# Patient Record
Sex: FEMALE | Race: WHITE | NOT HISPANIC OR LATINO | Employment: FULL TIME | URBAN - NONMETROPOLITAN AREA
[De-identification: names, ages, dates, MRNs, and addresses within clinical notes are randomized per-mention and may not be internally consistent; named-entity substitution may affect disease eponyms.]

---

## 2017-05-12 VITALS
WEIGHT: 212 LBS | HEIGHT: 65 IN | DIASTOLIC BLOOD PRESSURE: 70 MMHG | SYSTOLIC BLOOD PRESSURE: 112 MMHG | BODY MASS INDEX: 35.32 KG/M2

## 2017-05-12 RX ORDER — VALACYCLOVIR HYDROCHLORIDE 500 MG/1
500 TABLET, FILM COATED ORAL 2 TIMES DAILY
COMMUNITY
End: 2020-09-21 | Stop reason: SDUPTHER

## 2017-05-12 RX ORDER — BUPROPION HYDROCHLORIDE 150 MG/1
150 TABLET, EXTENDED RELEASE ORAL 2 TIMES DAILY
COMMUNITY
End: 2017-05-19

## 2017-05-12 RX ORDER — LEVOTHYROXINE SODIUM 0.03 MG/1
25 TABLET ORAL DAILY
COMMUNITY
End: 2017-05-19

## 2017-05-19 ENCOUNTER — OFFICE VISIT (OUTPATIENT)
Dept: OBSTETRICS AND GYNECOLOGY | Facility: CLINIC | Age: 52
End: 2017-05-19

## 2017-05-19 VITALS
HEIGHT: 65 IN | DIASTOLIC BLOOD PRESSURE: 70 MMHG | BODY MASS INDEX: 35.82 KG/M2 | WEIGHT: 215 LBS | SYSTOLIC BLOOD PRESSURE: 120 MMHG

## 2017-05-19 DIAGNOSIS — Z01.419 ENCOUNTER FOR GYNECOLOGICAL EXAMINATION WITHOUT ABNORMAL FINDING: Primary | ICD-10-CM

## 2017-05-19 PROCEDURE — 99396 PREV VISIT EST AGE 40-64: CPT | Performed by: OBSTETRICS & GYNECOLOGY

## 2017-05-19 RX ORDER — VALACYCLOVIR HYDROCHLORIDE 500 MG/1
TABLET, FILM COATED ORAL
COMMUNITY
Start: 2016-11-02 | End: 2017-05-19

## 2017-05-19 RX ORDER — SULFAMETHOXAZOLE AND TRIMETHOPRIM 800; 160 MG/1; MG/1
TABLET ORAL
COMMUNITY
Start: 2017-04-19 | End: 2017-05-19

## 2017-05-19 RX ORDER — FOLIC ACID 1 MG/1
TABLET ORAL
COMMUNITY
Start: 2016-11-23

## 2017-05-19 RX ORDER — BUPROPION HYDROCHLORIDE 300 MG/1
TABLET ORAL
COMMUNITY
Start: 2016-10-04 | End: 2020-09-21 | Stop reason: SDUPTHER

## 2017-05-19 RX ORDER — BUPROPION HYDROCHLORIDE 300 MG/1
TABLET ORAL
COMMUNITY
Start: 2017-04-19 | End: 2017-05-19

## 2017-05-26 DIAGNOSIS — Z01.419 ENCOUNTER FOR GYNECOLOGICAL EXAMINATION WITHOUT ABNORMAL FINDING: ICD-10-CM

## 2017-09-07 ENCOUNTER — TRANSCRIBE ORDERS (OUTPATIENT)
Dept: LAB | Facility: HOSPITAL | Age: 52
End: 2017-09-07

## 2017-09-07 ENCOUNTER — APPOINTMENT (OUTPATIENT)
Dept: LAB | Facility: HOSPITAL | Age: 52
End: 2017-09-07

## 2017-09-07 DIAGNOSIS — B35.1 DERMATOPHYTOSIS OF NAIL: Primary | ICD-10-CM

## 2017-09-07 LAB
ALBUMIN SERPL-MCNC: 4.4 G/DL (ref 3.2–4.8)
ALP SERPL-CCNC: 84 U/L (ref 25–100)
ALT SERPL W P-5'-P-CCNC: 24 U/L (ref 7–40)
AST SERPL-CCNC: 18 U/L (ref 0–33)
BILIRUB CONJ SERPL-MCNC: 0.1 MG/DL (ref 0–0.2)
BILIRUB INDIRECT SERPL-MCNC: 0.3 MG/DL (ref 0.1–1.1)
BILIRUB SERPL-MCNC: 0.4 MG/DL (ref 0.3–1.2)
PROT SERPL-MCNC: 7.1 G/DL (ref 5.7–8.2)

## 2017-09-07 PROCEDURE — 80076 HEPATIC FUNCTION PANEL: CPT | Performed by: PODIATRIST

## 2017-09-07 PROCEDURE — 36415 COLL VENOUS BLD VENIPUNCTURE: CPT | Performed by: PODIATRIST

## 2017-10-26 ENCOUNTER — TRANSCRIBE ORDERS (OUTPATIENT)
Dept: LAB | Facility: HOSPITAL | Age: 52
End: 2017-10-26

## 2017-10-26 ENCOUNTER — APPOINTMENT (OUTPATIENT)
Dept: LAB | Facility: HOSPITAL | Age: 52
End: 2017-10-26

## 2017-10-26 DIAGNOSIS — B35.1 DERMATOPHYTOSIS OF NAIL: Primary | ICD-10-CM

## 2017-10-26 LAB
ALBUMIN SERPL-MCNC: 4.2 G/DL (ref 3.2–4.8)
ALP SERPL-CCNC: 76 U/L (ref 25–100)
ALT SERPL W P-5'-P-CCNC: 23 U/L (ref 7–40)
AST SERPL-CCNC: 17 U/L (ref 0–33)
BILIRUB CONJ SERPL-MCNC: 0 MG/DL (ref 0–0.2)
BILIRUB INDIRECT SERPL-MCNC: 0.2 MG/DL (ref 0.1–1.1)
BILIRUB SERPL-MCNC: 0.2 MG/DL (ref 0.3–1.2)
PROT SERPL-MCNC: 6.8 G/DL (ref 5.7–8.2)

## 2017-10-26 PROCEDURE — 36415 COLL VENOUS BLD VENIPUNCTURE: CPT | Performed by: PODIATRIST

## 2017-10-26 PROCEDURE — 80076 HEPATIC FUNCTION PANEL: CPT | Performed by: PODIATRIST

## 2018-06-29 ENCOUNTER — OFFICE VISIT (OUTPATIENT)
Dept: OBSTETRICS AND GYNECOLOGY | Facility: CLINIC | Age: 53
End: 2018-06-29

## 2018-06-29 VITALS
BODY MASS INDEX: 35.82 KG/M2 | DIASTOLIC BLOOD PRESSURE: 70 MMHG | HEIGHT: 65 IN | SYSTOLIC BLOOD PRESSURE: 116 MMHG | WEIGHT: 215 LBS

## 2018-06-29 DIAGNOSIS — Z01.419 ENCOUNTER FOR GYNECOLOGICAL EXAMINATION WITHOUT ABNORMAL FINDING: Primary | ICD-10-CM

## 2018-06-29 PROCEDURE — 99396 PREV VISIT EST AGE 40-64: CPT | Performed by: OBSTETRICS & GYNECOLOGY

## 2018-06-29 NOTE — PROGRESS NOTES
Subjective   Chief Complaint   Patient presents with   • Gynecologic Exam     Last pap 2017 WNL, Hysterectomy 2005     Colt Adams is a 53 y.o. year old  presenting to be seen for her annual exam.     SEXUAL Hx:  She is currently sexually active.  In the past year there has not been new sexual partners.    Condoms are not typically used.  She would not like to be screened for STD's at today's exam.  Current birth control method: not using any form of contraception and does not wish to get pregnant.  MENSTRUAL Hx:  No LMP recorded. Patient has had a hysterectomy.  In the past 6 months her cycles have been absent.   Her menstrual flow has been absent.   Each month on average there are roughly 0 days of very heavy flow.    Intermenstrual bleeding is absent.    Post-coital bleeding is absent.  Dysmenorrhea: is not affecting her activities of daily living  PMS: is not affecting her activities of daily living  Her cycles are not a source of concern for her that she wishes to discuss today.  HEALTH Hx:  She exercises regularly: no (and has no plans to become more active).  She wears her seat belt:yes.  She has concerns about domestic violence: no.  OTHER COMPLAINTS:  Nothing else    The following portions of the patient's history were reviewed and updated as appropriate:  She  has a past medical history of Anemia; Arthritis; and Depression.  She  does not have a problem list on file.  She  has a past surgical history that includes  section; Cholecystectomy; Hysterectomy; Back surgery; and Other surgical history.  Her family history includes Brain cancer in her father; Breast cancer in her maternal aunt and maternal grandmother; Lung cancer in her father.  She  reports that she has never smoked. She has never used smokeless tobacco. She reports that she does not drink alcohol or use drugs.  Current Outpatient Prescriptions   Medication Sig Dispense Refill   • buPROPion XL (WELLBUTRIN XL) 300 MG 24 hr  "tablet      • Calcium Carbonate (CALTRATE 600 PO) Take  by mouth.     • folic acid (FOLVITE) 1 MG tablet      • Methylcellulose, Laxative, (CITRUCEL PO) Take  by mouth.     • valACYclovir (VALTREX) 500 MG tablet Take 500 mg by mouth 2 (Two) Times a Day.       No current facility-administered medications for this visit.      Current Outpatient Prescriptions on File Prior to Visit   Medication Sig   • buPROPion XL (WELLBUTRIN XL) 300 MG 24 hr tablet    • Calcium Carbonate (CALTRATE 600 PO) Take  by mouth.   • folic acid (FOLVITE) 1 MG tablet    • Methylcellulose, Laxative, (CITRUCEL PO) Take  by mouth.   • valACYclovir (VALTREX) 500 MG tablet Take 500 mg by mouth 2 (Two) Times a Day.     No current facility-administered medications on file prior to visit.      She is allergic to pertussis vaccines..    Smoking status: Never Smoker                                                              Smokeless tobacco: Never Used                        Review of Systems  Consitutional NEG: anorexia or night sweats    POS: nothing reported   Gastointestinal NEG: bloating, change in bowel habits, melena or reflux symptoms    POS: nothing reported   Genitourinary NEG: dysuria or hematuria    POS: nothing reported   Integument NEG: moles that are changing in size, shape, color or rashes    POS: nothing reported   Breast NEG: persistent breast lump, skin dimpling or nipple discharge    POS: nothing reported          Objective   /70   Ht 165.1 cm (65\")   Wt 97.5 kg (215 lb)   BMI 35.78 kg/m²     General:  well developed; well nourished  no acute distress   Skin:  No suspicious lesions seen   Thyroid: normal to inspection and palpation   Breasts:  Examined in supine position  Symmetric without masses or skin dimpling  Nipples normal without inversion, lesions or discharge  There are no palpable axillary nodes   Abdomen: soft, non-tender; no masses  no umbilical or inginual hernias are present  no hepato-splenomegaly   Pelvis: " Clinical staff was present for exam  External genitalia:  normal appearance of the external genitalia including Bartholin's and [place]'s glands.  :  urethral meatus normal;  Vaginal:  normal pink mucosa without prolapse or lesions.  Cervix:  absent.  Uterus:  absent.  Adnexa:  absent, bilateral.  Rectal:  digital rectal exam not performed; anus visually normal appearing.        Assessment   1. Normal PE     Plan   1. PAP done  2. MMG next month  3. Follow up     No orders of the defined types were placed in this encounter.         This note was electronically signed.      June 29, 2018

## 2018-07-11 DIAGNOSIS — Z01.419 ENCOUNTER FOR GYNECOLOGICAL EXAMINATION WITHOUT ABNORMAL FINDING: ICD-10-CM

## 2019-07-03 ENCOUNTER — OFFICE VISIT (OUTPATIENT)
Dept: OBSTETRICS AND GYNECOLOGY | Facility: CLINIC | Age: 54
End: 2019-07-03

## 2019-07-03 VITALS
SYSTOLIC BLOOD PRESSURE: 112 MMHG | WEIGHT: 210 LBS | BODY MASS INDEX: 34.99 KG/M2 | DIASTOLIC BLOOD PRESSURE: 66 MMHG | HEIGHT: 65 IN

## 2019-07-03 DIAGNOSIS — Z01.419 ENCOUNTER FOR GYNECOLOGICAL EXAMINATION WITHOUT ABNORMAL FINDING: Primary | ICD-10-CM

## 2019-07-03 PROCEDURE — 99396 PREV VISIT EST AGE 40-64: CPT | Performed by: OBSTETRICS & GYNECOLOGY

## 2019-07-03 NOTE — PROGRESS NOTES
Subjective   Chief Complaint   Patient presents with   • Gynecologic Exam     Last pap 2018 WNL,  Mammogram scheduled 7/10/2019     oClt Adams is a 54 y.o. year old  presenting to be seen for her annual exam.     SEXUAL Hx:  She is currently sexually active.  In the past year there has not been new sexual partners.    Condoms are not typically used.  She would not like to be screened for STD's at today's exam.  Current birth control method: hyst.  MENSTRUAL Hx:  No LMP recorded. Patient has had a hysterectomy.  In the past 6 months her cycles have been absent.   Her menstrual flow has been absent.   Each month on average there are roughly 0 days of very heavy flow.    Intermenstrual bleeding is absent.    Post-coital bleeding is absent.  Dysmenorrhea: is not affecting her activities of daily living  PMS: is not affecting her activities of daily living  Her cycles are not a source of concern for her that she wishes to discuss today.  HEALTH Hx:  She exercises regularly: no (and has no plans to become more active).  She wears her seat belt:yes.  She has concerns about domestic violence: no.  OTHER COMPLAINTS:  Nothing else    The following portions of the patient's history were reviewed and updated as appropriate:  She  has a past medical history of Anemia, Arthritis, and Depression.  She does not have a problem list on file.  She  has a past surgical history that includes  section; Cholecystectomy; Hysterectomy; Back surgery; and Other surgical history.  Her family history includes Brain cancer in her father; Breast cancer in her maternal aunt and maternal grandmother; Lung cancer in her father.  She  reports that she has never smoked. She has never used smokeless tobacco. She reports that she does not drink alcohol or use drugs.  Current Outpatient Medications   Medication Sig Dispense Refill   • buPROPion XL (WELLBUTRIN XL) 300 MG 24 hr tablet      • Calcium Carbonate (CALTRATE 600 PO)  "Take  by mouth.     • folic acid (FOLVITE) 1 MG tablet      • Methylcellulose, Laxative, (CITRUCEL PO) Take  by mouth.     • triamcinolone (KENALOG) 0.1 % ointment Apply  topically 3 (Three) Times a Day. 14- to 21 days 30 g 0   • valACYclovir (VALTREX) 500 MG tablet Take 500 mg by mouth 2 (Two) Times a Day.       No current facility-administered medications for this visit.      Current Outpatient Medications on File Prior to Visit   Medication Sig   • buPROPion XL (WELLBUTRIN XL) 300 MG 24 hr tablet    • Calcium Carbonate (CALTRATE 600 PO) Take  by mouth.   • folic acid (FOLVITE) 1 MG tablet    • Methylcellulose, Laxative, (CITRUCEL PO) Take  by mouth.   • triamcinolone (KENALOG) 0.1 % ointment Apply  topically 3 (Three) Times a Day. 14- to 21 days   • valACYclovir (VALTREX) 500 MG tablet Take 500 mg by mouth 2 (Two) Times a Day.     No current facility-administered medications on file prior to visit.      She is allergic to pertussis vaccines..    Social History    Tobacco Use      Smoking status: Never Smoker      Smokeless tobacco: Never Used    Review of Systems  Consitutional NEG: anorexia or night sweats    POS: nothing reported   Gastointestinal NEG: bloating, change in bowel habits, melena or reflux symptoms    POS: nothing reported   Genitourinary NEG: dysuria or hematuria    POS: nothing reported   Integument NEG: moles that are changing in size, shape, color or rashes    POS: nothing reported   Breast NEG: persistent breast lump, skin dimpling or nipple discharge    POS: nothing reported          Objective   /66   Ht 165.1 cm (65\")   Wt 95.3 kg (210 lb)   BMI 34.95 kg/m²     General:  well developed; well nourished  no acute distress  obese - Body mass index is 34.95 kg/m².   Skin:  No suspicious lesions seen   Thyroid: normal to inspection and palpation   Breasts:  Examined in supine position  Symmetric without masses or skin dimpling  Nipples normal without inversion, lesions or " discharge  There are no palpable axillary nodes   Abdomen: soft, non-tender; no masses  no umbilical or inguinal hernias are present  no hepato-splenomegaly   Pelvis: Clinical staff was present for exam  External genitalia:  normal appearance of the external genitalia including Bartholin's and [place]'s glands.  :  urethral meatus normal;  Vaginal:  normal pink mucosa without prolapse or lesions.  Cervix:  absent.  Uterus:  absent.  Adnexa:  absent, bilateral.  Rectal:  digital rectal exam not performed; anus visually normal appearing.        Assessment   1. Normal PE     Plan   1. PAP done  2. MMG set up   3. Follow up     No orders of the defined types were placed in this encounter.         This note was electronically signed.      July 3, 2019

## 2019-07-12 DIAGNOSIS — Z01.419 ENCOUNTER FOR GYNECOLOGICAL EXAMINATION WITHOUT ABNORMAL FINDING: ICD-10-CM

## 2020-07-07 ENCOUNTER — OFFICE VISIT (OUTPATIENT)
Dept: OBSTETRICS AND GYNECOLOGY | Facility: CLINIC | Age: 55
End: 2020-07-07

## 2020-07-07 VITALS
WEIGHT: 225 LBS | DIASTOLIC BLOOD PRESSURE: 70 MMHG | HEIGHT: 65 IN | BODY MASS INDEX: 37.49 KG/M2 | SYSTOLIC BLOOD PRESSURE: 118 MMHG

## 2020-07-07 DIAGNOSIS — Z01.419 ENCOUNTER FOR GYNECOLOGICAL EXAMINATION WITHOUT ABNORMAL FINDING: Primary | ICD-10-CM

## 2020-07-07 PROCEDURE — 99396 PREV VISIT EST AGE 40-64: CPT | Performed by: OBSTETRICS & GYNECOLOGY

## 2020-07-07 NOTE — PROGRESS NOTES
Subjective   Chief Complaint   Patient presents with   • Gynecologic Exam     LAst pap 2019 Last Mamm 2019 [facility], has one scheduled for 20      Carendominik Adams is a 55 y.o. year old  presenting to be seen for her annual exam.     SEXUAL Hx:  She is currently sexually active.  In the past year there has not been new sexual partners.    Condoms are not typically used.  She would not like to be screened for STD's at today's exam.  Current birth control method: Menopause.  MENSTRUAL Hx:  No LMP recorded. Patient has had a hysterectomy.  In the past 6 months her cycles have been absent.   Her menstrual flow has been absent.   Each month on average there are roughly 0 days of very heavy flow.    Intermenstrual bleeding is absent.    Post-coital bleeding is absent.  Dysmenorrhea: is not affecting her activities of daily living  PMS: is not affecting her activities of daily living  Her cycles are not a source of concern for her that she wishes to discuss today.  HEALTH Hx:  She exercises regularly: no (and has no plans to become more active).  She wears her seat belt:yes.  She has concerns about domestic violence: no.  OTHER COMPLAINTS:  Nothing else    The following portions of the patient's history were reviewed and updated as appropriate:  She  has a past medical history of Anemia, Arthritis, and Depression.  She does not have a problem list on file.  She  has a past surgical history that includes  section; Cholecystectomy; Hysterectomy; Back surgery; and Other surgical history.  Her family history includes Brain cancer in her father; Breast cancer in her maternal aunt and maternal grandmother; Lung cancer in her father.  She  reports that she has never smoked. She has never used smokeless tobacco. She reports that she does not drink alcohol or use drugs.  Current Outpatient Medications   Medication Sig Dispense Refill   • buPROPion XL (WELLBUTRIN XL) 300 MG 24 hr tablet      • busPIRone  (BUSPAR) 15 MG tablet TAKE 1 2 (ONE HALF) TABLET BY MOUTH TWICE DAILY FOR 7 DAYS THEN INCREASE TO ONE TABLET TWICE DAILY     • Calcium Carbonate (CALTRATE 600 PO) Take  by mouth.     • folic acid (FOLVITE) 1 MG tablet      • Methylcellulose, Laxative, (CITRUCEL PO) Take  by mouth.     • Multiple Vitamins-Minerals (OSTEO COMPLEX PO) Take  by mouth.     • nitrofurantoin, macrocrystal-monohydrate, (MACROBID) 100 MG capsule Take 1 capsule by mouth 2 (Two) Times a Day. 14 capsule 0   • Omega-3 Fatty Acids (FISH OIL) 1000 MG capsule capsule Take  by mouth Daily With Breakfast.     • triamcinolone (KENALOG) 0.1 % ointment Apply  topically 3 (Three) Times a Day. 14- to 21 days 30 g 0   • valACYclovir (VALTREX) 500 MG tablet Take 500 mg by mouth 2 (Two) Times a Day.       No current facility-administered medications for this visit.      Current Outpatient Medications on File Prior to Visit   Medication Sig   • buPROPion XL (WELLBUTRIN XL) 300 MG 24 hr tablet    • busPIRone (BUSPAR) 15 MG tablet TAKE 1 2 (ONE HALF) TABLET BY MOUTH TWICE DAILY FOR 7 DAYS THEN INCREASE TO ONE TABLET TWICE DAILY   • Calcium Carbonate (CALTRATE 600 PO) Take  by mouth.   • folic acid (FOLVITE) 1 MG tablet    • Methylcellulose, Laxative, (CITRUCEL PO) Take  by mouth.   • Multiple Vitamins-Minerals (OSTEO COMPLEX PO) Take  by mouth.   • nitrofurantoin, macrocrystal-monohydrate, (MACROBID) 100 MG capsule Take 1 capsule by mouth 2 (Two) Times a Day.   • Omega-3 Fatty Acids (FISH OIL) 1000 MG capsule capsule Take  by mouth Daily With Breakfast.   • triamcinolone (KENALOG) 0.1 % ointment Apply  topically 3 (Three) Times a Day. 14- to 21 days   • valACYclovir (VALTREX) 500 MG tablet Take 500 mg by mouth 2 (Two) Times a Day.     No current facility-administered medications on file prior to visit.      She is allergic to pertussis vaccines..    Social History    Tobacco Use      Smoking status: Never Smoker      Smokeless tobacco: Never Used    Review of  "Systems  Consitutional NEG: anorexia or night sweats    POS: nothing reported   Gastointestinal NEG: bloating, change in bowel habits, melena or reflux symptoms    POS: nothing reported   Genitourinary NEG: dysuria or hematuria    POS: nothing reported   Integument NEG: moles that are changing in size, shape, color or rashes    POS: nothing reported   Breast NEG: persistent breast lump, skin dimpling or nipple discharge    POS: nothing reported          Objective   /70   Ht 165.1 cm (65\")   Wt 102 kg (225 lb)   BMI 37.44 kg/m²     General:  well developed; well nourished  no acute distress   Skin:  No suspicious lesions seen   Thyroid: normal to inspection and palpation   Breasts:  Examined in supine position  Symmetric without masses or skin dimpling  Nipples normal without inversion, lesions or discharge  There are no palpable axillary nodes   Abdomen: soft, non-tender; no masses  no umbilical or inguinal hernias are present  no hepato-splenomegaly   Pelvis: Clinical staff was present for exam  External genitalia:  normal appearance of the external genitalia including Bartholin's and [place]'s glands.  :  urethral meatus normal;  Vaginal:  normal pink mucosa without prolapse or lesions.  Cervix:  normal appearance.  Uterus:  normal size, shape and consistency.  Adnexa:  normal bimanual exam of the adnexa.  Rectal:  digital rectal exam not performed; anus visually normal appearing.        Assessment   1. PE WNL     Plan   1. PAP done  2. MMG set up  3. KEnalog ointment re prescribed     No orders of the defined types were placed in this encounter.         This note was electronically signed.      July 7, 2020    "

## 2020-07-08 ENCOUNTER — TELEPHONE (OUTPATIENT)
Dept: OBSTETRICS AND GYNECOLOGY | Facility: CLINIC | Age: 55
End: 2020-07-08

## 2020-07-08 NOTE — TELEPHONE ENCOUNTER
----- Message from Karoline Huynh sent at 7/8/2020  9:49 AM EDT -----  Contact: PT  PT SAW DR DUTTON YESTERDAY FOR ANNUAL AND COULDN'T REMEMBER AT THE TIME WHEN HER LAST COLONOSCOPY WAS.  SHE CALLED BACK TO LET US KNOW IT WAS 4/17/13.  THANKS

## 2020-07-16 DIAGNOSIS — Z01.419 ENCOUNTER FOR GYNECOLOGICAL EXAMINATION WITHOUT ABNORMAL FINDING: ICD-10-CM

## 2020-11-12 PROCEDURE — U0004 COV-19 TEST NON-CDC HGH THRU: HCPCS | Performed by: NURSE PRACTITIONER

## 2021-07-13 ENCOUNTER — OFFICE VISIT (OUTPATIENT)
Dept: OBSTETRICS AND GYNECOLOGY | Facility: CLINIC | Age: 56
End: 2021-07-13

## 2021-07-13 VITALS
HEIGHT: 65 IN | DIASTOLIC BLOOD PRESSURE: 90 MMHG | SYSTOLIC BLOOD PRESSURE: 138 MMHG | WEIGHT: 221.6 LBS | BODY MASS INDEX: 36.92 KG/M2

## 2021-07-13 DIAGNOSIS — Z01.419 ENCOUNTER FOR GYNECOLOGICAL EXAMINATION (GENERAL) (ROUTINE) WITHOUT ABNORMAL FINDINGS: Primary | ICD-10-CM

## 2021-07-13 DIAGNOSIS — Z12.31 ENCOUNTER FOR SCREENING MAMMOGRAM FOR BREAST CANCER: ICD-10-CM

## 2021-07-13 PROCEDURE — 99396 PREV VISIT EST AGE 40-64: CPT | Performed by: OBSTETRICS & GYNECOLOGY

## 2021-07-13 NOTE — PROGRESS NOTES
Subjective   Chief Complaint   Patient presents with   • Gynecologic Exam     Colt Adams is a 56 y.o. year old .  No LMP recorded. Patient has had a hysterectomy.  She presents to be seen because of annual PE.   No menses/Hysterectomy  MMG WNL   Colonoscopy  Possible TIA last summer--has seen Neuro      OTHER COMPLAINTS:  Nothing else    The following portions of the patient's history were reviewed and updated as appropriate:  She  has a past medical history of Anemia, Arthritis, Depression, and Disease of thyroid gland.  She does not have a problem list on file.  She  has a past surgical history that includes  section; Cholecystectomy; Hysterectomy; Back surgery; and Other surgical history.  Her family history includes Brain cancer in her father; Breast cancer in her maternal aunt and maternal grandmother; Lung cancer in her father.  She  reports that she has never smoked. She has never used smokeless tobacco. She reports that she does not drink alcohol and does not use drugs.  Current Outpatient Medications   Medication Sig Dispense Refill   • atorvastatin (LIPITOR) 10 MG tablet      • buPROPion XL (WELLBUTRIN XL) 150 MG 24 hr tablet bupropion HCl  mg 24 hr tablet, extended release     • Calcium Carbonate (CALTRATE 600 PO) Take  by mouth.     • ezetimibe (ZETIA) 10 MG tablet      • FERROUS SULFATE ER  mg.     • folic acid (FOLVITE) 1 MG tablet      • levothyroxine (SYNTHROID, LEVOTHROID) 50 MCG tablet levothyroxine 50 mcg tablet     • Methylcellulose, Laxative, (CITRUCEL PO) Take  by mouth.     • Multiple Vitamins-Minerals (OSTEO COMPLEX PO) Take  by mouth.     • Omega-3 Fatty Acids (FISH OIL) 1000 MG capsule capsule Take  by mouth Daily With Breakfast.     • valACYclovir (VALTREX) 500 MG tablet valacyclovir 500 mg tablet     • Zinc Sulfate (ORAZINC PO) zinc sulfate       No current facility-administered medications for this visit.     Current Outpatient Medications on File  "Prior to Visit   Medication Sig   • atorvastatin (LIPITOR) 10 MG tablet    • buPROPion XL (WELLBUTRIN XL) 150 MG 24 hr tablet bupropion HCl  mg 24 hr tablet, extended release   • Calcium Carbonate (CALTRATE 600 PO) Take  by mouth.   • ezetimibe (ZETIA) 10 MG tablet    • FERROUS SULFATE ER  mg.   • folic acid (FOLVITE) 1 MG tablet    • levothyroxine (SYNTHROID, LEVOTHROID) 50 MCG tablet levothyroxine 50 mcg tablet   • Methylcellulose, Laxative, (CITRUCEL PO) Take  by mouth.   • Multiple Vitamins-Minerals (OSTEO COMPLEX PO) Take  by mouth.   • Omega-3 Fatty Acids (FISH OIL) 1000 MG capsule capsule Take  by mouth Daily With Breakfast.   • valACYclovir (VALTREX) 500 MG tablet valacyclovir 500 mg tablet   • Zinc Sulfate (ORAZINC PO) zinc sulfate   • [DISCONTINUED] busPIRone (BUSPAR) 15 MG tablet buspirone 15 mg tablet     No current facility-administered medications on file prior to visit.     She is allergic to pertussis vaccines.    Social History    Tobacco Use      Smoking status: Never Smoker      Smokeless tobacco: Never Used    Review of Systems  Consitutional POS: nothing reported    NEG: anorexia or night sweats   Gastointestinal POS: nothing reported    NEG: bloating, change in bowel habits, melena or reflux symptoms   Genitourinary POS: nothing reported    NEG: dysuria or hematuria   Integument POS: nothing reported    NEG: moles that are changing in size, shape, color or rashes   Breast POS: nothing reported    NEG: persistent breast lump, skin dimpling or nipple discharge         Respiratory: negative  Cardiovascular: negative          Objective   /90   Ht 165.1 cm (65\")   Wt 101 kg (221 lb 9.6 oz)   BMI 36.88 kg/m²     General:  well developed; well nourished  no acute distress   Skin:  No suspicious lesions seen   Thyroid: normal to inspection and palpation   Lungs:  breathing is unlabored  clear to auscultation bilaterally   Heart:  regular rate and rhythm, S1, S2 normal, no murmur, " click, rub or gallop   Breasts:  Examined in supine position  Symmetric without masses or skin dimpling  Nipples normal without inversion, lesions or discharge  There are no palpable axillary nodes   Abdomen: soft, non-tender; no masses  no umbilical or inguinal hernias are present  no hepato-splenomegaly   Pelvis: Clinical staff was present for exam  External genitalia:  normal appearance of the external genitalia including Bartholin's and [place]'s glands.  :  urethral meatus normal;  Vaginal:  normal pink mucosa without prolapse or lesions.  Cervix:  absent.  Uterus:  absent.  Adnexa:  absent, bilateral.  Rectal:  digital rectal exam not performed; anus visually normal appearing.     Psychiatric: Alert and oriented ×3, mood and affect appropriate  HEENT: Atraumatic, normocephalic, normal scleral icterus  Extremities: 2+ pulses bilaterally, no edema      Lab Review   No data reviewed    Imaging   SCANNED - MAMMO (07/20/2020)         Assessment   1. Normal     Plan   1. Pap done  2. MMG ordered  3. Diet/exercise    No orders of the defined types were placed in this encounter.         This note was electronically signed.      July 13, 2021

## 2021-07-22 DIAGNOSIS — Z01.419 ENCOUNTER FOR GYNECOLOGICAL EXAMINATION (GENERAL) (ROUTINE) WITHOUT ABNORMAL FINDINGS: ICD-10-CM

## 2022-08-10 ENCOUNTER — OFFICE VISIT (OUTPATIENT)
Dept: OBSTETRICS AND GYNECOLOGY | Facility: CLINIC | Age: 57
End: 2022-08-10

## 2022-08-10 VITALS — WEIGHT: 217.2 LBS | BODY MASS INDEX: 36.14 KG/M2 | DIASTOLIC BLOOD PRESSURE: 72 MMHG | SYSTOLIC BLOOD PRESSURE: 120 MMHG

## 2022-08-10 DIAGNOSIS — Z01.419 ENCOUNTER FOR GYNECOLOGICAL EXAMINATION WITHOUT ABNORMAL FINDING: Primary | ICD-10-CM

## 2022-08-10 PROCEDURE — 99396 PREV VISIT EST AGE 40-64: CPT | Performed by: OBSTETRICS & GYNECOLOGY

## 2022-08-10 NOTE — PROGRESS NOTES
Subjective   Chief Complaint   Patient presents with   • Gynecologic Exam     Yearly exam and pap smear     Colt Adams is a 57 y.o. year old .  No LMP recorded. Patient has had a hysterectomy.  She presents to be seen because of annual exam.  Doing well     OTHER COMPLAINTS:  Nothing else    The following portions of the patient's history were reviewed and updated as appropriate:  She  has a past medical history of Anemia, Arthritis, Depression, and Disease of thyroid gland.  She does not have a problem list on file.  She  has a past surgical history that includes  section; Cholecystectomy; Hysterectomy; Back surgery; and Other surgical history.  Her family history includes Brain cancer in her father; Breast cancer in her maternal aunt and maternal grandmother; Lung cancer in her father.  She  reports that she has never smoked. She has never used smokeless tobacco. She reports that she does not drink alcohol and does not use drugs.  Current Outpatient Medications   Medication Sig Dispense Refill   • atorvastatin (LIPITOR) 10 MG tablet      • Calcium Carbonate (CALTRATE 600 PO) Take  by mouth.     • ergocalciferol (ERGOCALCIFEROL) 1.25 MG (57341 UT) capsule Vitamin D2 1,250 mcg (50,000 unit) capsule   As Directed     • ezetimibe (ZETIA) 10 MG tablet      • FERROUS SULFATE ER  mg.     • folic acid (FOLVITE) 1 MG tablet      • levothyroxine (SYNTHROID, LEVOTHROID) 50 MCG tablet levothyroxine 50 mcg tablet     • Methylcellulose, Laxative, (CITRUCEL PO) Take  by mouth.     • Misc Natural Products (OSTEO BI-FLEX/5-LOXIN ADVANCED PO) Take  by mouth.     • Multiple Vitamins-Minerals (OSTEO COMPLEX PO) Take  by mouth.     • Omega-3 Fatty Acids (FISH OIL) 1000 MG capsule capsule Take  by mouth Daily With Breakfast.     • valACYclovir (VALTREX) 500 MG tablet valacyclovir 500 mg tablet     • Valsartan (DIOVAN PO) valsartan     • Zinc Sulfate (ORAZINC PO) zinc sulfate     • buPROPion XL (WELLBUTRIN  XL) 150 MG 24 hr tablet bupropion HCl  mg 24 hr tablet, extended release     • phenazopyridine (Pyridium) 100 MG tablet Take 1 tablet by mouth 3 (Three) Times a Day. 6 tablet 0     No current facility-administered medications for this visit.     Current Outpatient Medications on File Prior to Visit   Medication Sig   • atorvastatin (LIPITOR) 10 MG tablet    • Calcium Carbonate (CALTRATE 600 PO) Take  by mouth.   • ergocalciferol (ERGOCALCIFEROL) 1.25 MG (15341 UT) capsule Vitamin D2 1,250 mcg (50,000 unit) capsule   As Directed   • ezetimibe (ZETIA) 10 MG tablet    • FERROUS SULFATE ER  mg.   • folic acid (FOLVITE) 1 MG tablet    • levothyroxine (SYNTHROID, LEVOTHROID) 50 MCG tablet levothyroxine 50 mcg tablet   • Methylcellulose, Laxative, (CITRUCEL PO) Take  by mouth.   • Misc Natural Products (OSTEO BI-FLEX/5-LOXIN ADVANCED PO) Take  by mouth.   • Multiple Vitamins-Minerals (OSTEO COMPLEX PO) Take  by mouth.   • Omega-3 Fatty Acids (FISH OIL) 1000 MG capsule capsule Take  by mouth Daily With Breakfast.   • valACYclovir (VALTREX) 500 MG tablet valacyclovir 500 mg tablet   • Valsartan (DIOVAN PO) valsartan   • Zinc Sulfate (ORAZINC PO) zinc sulfate   • buPROPion XL (WELLBUTRIN XL) 150 MG 24 hr tablet bupropion HCl  mg 24 hr tablet, extended release   • phenazopyridine (Pyridium) 100 MG tablet Take 1 tablet by mouth 3 (Three) Times a Day.     No current facility-administered medications on file prior to visit.     She is allergic to pertussis vaccines.    Social History    Tobacco Use      Smoking status: Never Smoker      Smokeless tobacco: Never Used    Review of Systems  Consitutional POS: nothing reported    NEG: anorexia or night sweats   Gastointestinal POS: nothing reported    NEG: bloating, change in bowel habits, melena or reflux symptoms   Genitourinary POS: nothing reported    NEG: dysuria or hematuria   Integument POS: nothing reported    NEG: moles that are changing in size, shape,  color or rashes   Breast POS: nothing reported    NEG: persistent breast lump, skin dimpling or nipple discharge         Respiratory: negative  Cardiovascular: negative          Objective   /72   Wt 98.5 kg (217 lb 3.2 oz)   Breastfeeding No   BMI 36.14 kg/m²     General:  well developed; well nourished  no acute distress   Skin:  No suspicious lesions seen   Thyroid: normal to inspection and palpation   Lungs:  breathing is unlabored  clear to auscultation bilaterally   Heart:  regular rate and rhythm, S1, S2 normal, no murmur, click, rub or gallop   Breasts:  Examined in supine position  Symmetric without masses or skin dimpling  Nipples normal without inversion, lesions or discharge  There are no palpable axillary nodes   Abdomen: soft, non-tender; no masses  no umbilical or inguinal hernias are present  no hepato-splenomegaly   Pelvis: Clinical staff was present for exam  External genitalia:  normal appearance of the external genitalia including Bartholin's and [place]'s glands.  :  urethral meatus normal;  Vaginal:  normal pink mucosa without prolapse or lesions.  Cervix:  absent.  Uterus:  absent.  Adnexa:  absent, bilateral.  Rectal:  digital rectal exam not performed; anus visually normal appearing.     Psychiatric: Alert and oriented ×3, mood and affect appropriate  HEENT: Atraumatic, normocephalic, normal scleral icterus  Extremities: 2+ pulses bilaterally, no edema      Lab Review   No data reviewed    Imaging   Mammogram report   SCANNED - MAMMO (08/03/2022)       Assessment   1. Pe WNL     Plan   1. PAP done  2. MMG UTD and WNL  3. Check with PCP on colonoscopy  4. Diet/exercise    No orders of the defined types were placed in this encounter.         This note was electronically signed.      August 10, 2022

## 2022-08-13 LAB — REF LAB TEST METHOD: NORMAL

## 2022-08-15 RX ORDER — FLUCONAZOLE 150 MG/1
150 TABLET ORAL DAILY
Qty: 1 TABLET | Refills: 0 | Status: SHIPPED | OUTPATIENT
Start: 2022-08-15

## 2023-09-20 ENCOUNTER — OFFICE VISIT (OUTPATIENT)
Dept: OBSTETRICS AND GYNECOLOGY | Facility: CLINIC | Age: 58
End: 2023-09-20
Payer: COMMERCIAL

## 2023-09-20 VITALS — SYSTOLIC BLOOD PRESSURE: 118 MMHG | WEIGHT: 227.8 LBS | BODY MASS INDEX: 37.91 KG/M2 | DIASTOLIC BLOOD PRESSURE: 62 MMHG

## 2023-09-20 DIAGNOSIS — Z12.31 ENCOUNTER FOR SCREENING MAMMOGRAM FOR MALIGNANT NEOPLASM OF BREAST: Primary | ICD-10-CM

## 2023-09-20 DIAGNOSIS — Z01.419 ENCOUNTER FOR GYNECOLOGICAL EXAMINATION WITHOUT ABNORMAL FINDING: ICD-10-CM

## 2023-09-20 NOTE — PROGRESS NOTES
Subjective   Chief Complaint   Patient presents with    Gynecologic Exam     Yearly exam and pap smear     Colt Adams is a 58 y.o. year old .  No LMP recorded. Patient has had a hysterectomy.  She presents to be seen because of annual exam.     OTHER COMPLAINTS:  Nothing else    The following portions of the patient's history were reviewed and updated as appropriate:She  has a past medical history of Anemia, Arthritis, Depression, and Disease of thyroid gland.  She does not have a problem list on file.  She  has a past surgical history that includes  section; Cholecystectomy; Hysterectomy; Back surgery; and Other surgical history.  Her family history includes Brain cancer in her father; Breast cancer in her maternal aunt and maternal grandmother; Lung cancer in her father.  She  reports that she has never smoked. She has never used smokeless tobacco. She reports that she does not drink alcohol and does not use drugs.  Current Outpatient Medications   Medication Sig Dispense Refill    buPROPion XL (WELLBUTRIN XL) 150 MG 24 hr tablet 1 tablet 3 (Three) Times a Day.      Calcium Carbonate (CALTRATE 600 PO) Take  by mouth.      ergocalciferol (ERGOCALCIFEROL) 1.25 MG (94418 UT) capsule Vitamin D2 1,250 mcg (50,000 unit) capsule   As Directed      ezetimibe (ZETIA) 10 MG tablet       folic acid (FOLVITE) 1 MG tablet       levothyroxine (SYNTHROID, LEVOTHROID) 50 MCG tablet 88 mcg.      Misc Natural Products (OSTEO BI-FLEX/5-LOXIN ADVANCED PO) Take  by mouth.      Omega-3 Fatty Acids (FISH OIL) 1000 MG capsule capsule Take  by mouth Daily With Breakfast.      valACYclovir (VALTREX) 500 MG tablet valacyclovir 500 mg tablet      Valsartan (DIOVAN PO) 320 mg.      Zinc Sulfate (ORAZINC PO) zinc sulfate      atorvastatin (LIPITOR) 10 MG tablet       FERROUS SULFATE ER  mg.      fluconazole (Diflucan) 150 MG tablet Take 1 tablet by mouth Daily. 1 tablet 0    Methylcellulose, Laxative, (CITRUCEL  PO) Take  by mouth. (Patient not taking: Reported on 9/20/2023)      Multiple Vitamins-Minerals (OSTEO COMPLEX PO) Take  by mouth. (Patient not taking: Reported on 9/20/2023)      phenazopyridine (Pyridium) 100 MG tablet Take 1 tablet by mouth 3 (Three) Times a Day. 6 tablet 0     No current facility-administered medications for this visit.     Current Outpatient Medications on File Prior to Visit   Medication Sig    buPROPion XL (WELLBUTRIN XL) 150 MG 24 hr tablet 1 tablet 3 (Three) Times a Day.    Calcium Carbonate (CALTRATE 600 PO) Take  by mouth.    ergocalciferol (ERGOCALCIFEROL) 1.25 MG (89251 UT) capsule Vitamin D2 1,250 mcg (50,000 unit) capsule   As Directed    ezetimibe (ZETIA) 10 MG tablet     folic acid (FOLVITE) 1 MG tablet     levothyroxine (SYNTHROID, LEVOTHROID) 50 MCG tablet 88 mcg.    Misc Natural Products (OSTEO BI-FLEX/5-LOXIN ADVANCED PO) Take  by mouth.    Omega-3 Fatty Acids (FISH OIL) 1000 MG capsule capsule Take  by mouth Daily With Breakfast.    valACYclovir (VALTREX) 500 MG tablet valacyclovir 500 mg tablet    Valsartan (DIOVAN PO) 320 mg.    Zinc Sulfate (ORAZINC PO) zinc sulfate    atorvastatin (LIPITOR) 10 MG tablet     FERROUS SULFATE ER  mg.    fluconazole (Diflucan) 150 MG tablet Take 1 tablet by mouth Daily.    Methylcellulose, Laxative, (CITRUCEL PO) Take  by mouth. (Patient not taking: Reported on 9/20/2023)    Multiple Vitamins-Minerals (OSTEO COMPLEX PO) Take  by mouth. (Patient not taking: Reported on 9/20/2023)    phenazopyridine (Pyridium) 100 MG tablet Take 1 tablet by mouth 3 (Three) Times a Day.     No current facility-administered medications on file prior to visit.     She is allergic to pertussis vaccines.    Social History    Tobacco Use      Smoking status: Never      Smokeless tobacco: Never    Review of Systems  Consitutional POS: nothing reported    NEG: anorexia or night sweats   Gastointestinal POS: nothing reported    NEG: bloating, change in bowel habits,  melena, or reflux symptoms   Genitourinary POS: nothing reported    NEG: dysuria or hematuria   Integument POS: nothing reported    NEG: moles that are changing in size, shape, color or rashes   Breast POS: nothing reported    NEG: persistent breast lump, skin dimpling, or nipple discharge         Respiratory: negative  Cardiovascular: negative          Objective   /62   Wt 103 kg (227 lb 12.8 oz)   BMI 37.91 kg/m²     General:  well developed; well nourished  no acute distress   Skin:  No suspicious lesions seen   Thyroid: normal to inspection and palpation   Lungs:  breathing is unlabored  clear to auscultation bilaterally   Heart:  regular rate and rhythm, S1, S2 normal, no murmur, click, rub or gallop   Breasts:  Examined in supine position  Symmetric without masses or skin dimpling  Nipples normal without inversion, lesions or discharge  There are no palpable axillary nodes   Abdomen: soft, non-tender; no masses  no umbilical or inguinal hernias are present  no hepato-splenomegaly   Pelvis: Clinical staff was present for exam  External genitalia:  normal appearance of the external genitalia including [place]th[place]'s and [place]'s glands.  :  urethral meatus normal;  Vaginal:  normal pink mucosa without prolapse or lesions.  Cervix:  absent.  Uterus:  absent.  Adnexa:  absent, bilateral.  Rectal:  digital rectal exam not performed; anus visually normal appearing.     Psychiatric: Alert and oriented ×3, mood and affect appropriate  HEENT: Atraumatic, normocephalic, normal scleral icterus  Extremities: 2+ pulses bilaterally, no edema      Lab Review   No data reviewed    Imaging   No data reviewed        Assessment   Normal PE  Amb ref for resumption of stress incontinence if watned - will think on-- did have asling with hyst 25 yearsago     Plan   PAP done  MMG UTD and WNL  Diet/exercise  No orders of the defined types were placed in this encounter.         This note was electronically signed.      September  20, 2023

## 2023-09-22 LAB — REF LAB TEST METHOD: NORMAL

## 2024-09-24 ENCOUNTER — OFFICE VISIT (OUTPATIENT)
Dept: OBSTETRICS AND GYNECOLOGY | Facility: CLINIC | Age: 59
End: 2024-09-24
Payer: COMMERCIAL

## 2024-09-24 VITALS
BODY MASS INDEX: 37.15 KG/M2 | WEIGHT: 223 LBS | HEIGHT: 65 IN | SYSTOLIC BLOOD PRESSURE: 120 MMHG | DIASTOLIC BLOOD PRESSURE: 82 MMHG

## 2024-09-24 DIAGNOSIS — Z12.31 ENCOUNTER FOR SCREENING MAMMOGRAM FOR MALIGNANT NEOPLASM OF BREAST: Primary | ICD-10-CM

## 2024-09-24 DIAGNOSIS — Z01.419 ENCOUNTER FOR GYNECOLOGICAL EXAMINATION WITHOUT ABNORMAL FINDING: ICD-10-CM

## 2024-09-24 PROCEDURE — 99396 PREV VISIT EST AGE 40-64: CPT | Performed by: OBSTETRICS & GYNECOLOGY

## 2024-09-24 RX ORDER — MULTIVITAMIN WITH IRON
TABLET ORAL
COMMUNITY
Start: 2024-09-17

## 2024-09-24 RX ORDER — PRENATAL VIT/IRON FUM/FOLIC AC 27MG-0.8MG
TABLET ORAL DAILY
COMMUNITY

## 2024-09-24 RX ORDER — GLUCOSAMINE/D3/BOSWELLIA SERRA 1500MG-400
TABLET ORAL
COMMUNITY
Start: 2024-09-17

## 2024-09-24 RX ORDER — OMEPRAZOLE 20 MG/1
TABLET, DELAYED RELEASE ORAL
COMMUNITY
Start: 2024-09-17

## 2024-10-02 LAB — REF LAB TEST METHOD: NORMAL
